# Patient Record
Sex: FEMALE | Race: OTHER | HISPANIC OR LATINO
[De-identification: names, ages, dates, MRNs, and addresses within clinical notes are randomized per-mention and may not be internally consistent; named-entity substitution may affect disease eponyms.]

---

## 2021-06-14 PROBLEM — Z00.00 ENCOUNTER FOR PREVENTIVE HEALTH EXAMINATION: Status: ACTIVE | Noted: 2021-06-14

## 2021-06-15 ENCOUNTER — APPOINTMENT (OUTPATIENT)
Dept: ANTEPARTUM | Facility: CLINIC | Age: 31
End: 2021-06-15
Payer: MEDICAID

## 2021-06-15 ENCOUNTER — ASOB RESULT (OUTPATIENT)
Age: 31
End: 2021-06-15

## 2021-06-15 PROCEDURE — 76813 OB US NUCHAL MEAS 1 GEST: CPT

## 2021-06-18 LAB
1ST TRIMESTER DATA: NORMAL
ADDENDUM DOC: NORMAL
AFP PNL SERPL: NORMAL
AFP SERPL-ACNC: NORMAL
CLINICAL BIOCHEMIST REVIEW: NORMAL
FREE BETA HCG 1ST TRIMESTER: NORMAL
Lab: NORMAL
NOTES NTD: NORMAL
NT: NORMAL
PAPP-A SERPL-ACNC: NORMAL
TRISOMY 18/3: NORMAL

## 2021-07-13 ENCOUNTER — APPOINTMENT (OUTPATIENT)
Dept: ANTEPARTUM | Facility: CLINIC | Age: 31
End: 2021-07-13
Payer: MEDICAID

## 2021-07-13 DIAGNOSIS — O35.1XX0 MATERNAL CARE FOR (SUSPECTED) CHROMOSOMAL ABNORMALITY IN FETUS, NOT APPLICABLE OR UNSPECIFIED: ICD-10-CM

## 2021-07-13 PROCEDURE — 36415 COLL VENOUS BLD VENIPUNCTURE: CPT

## 2021-07-16 LAB
1ST TRIMESTER DATA: NORMAL
2ND TRIMESTER DATA: NORMAL
AFP PNL SERPL: NORMAL
AFP SERPL-ACNC: NORMAL
AFP SERPL-ACNC: NORMAL
B-HCG FREE SERPL-MCNC: NORMAL
CLINICAL BIOCHEMIST REVIEW: NORMAL
FREE BETA HCG 1ST TRIMESTER: NORMAL
INHIBIN A SERPL-MCNC: NORMAL
NOTES NTD: NORMAL
NT: NORMAL
PAPP-A SERPL-ACNC: NORMAL
U ESTRIOL SERPL-SCNC: NORMAL

## 2021-07-27 ENCOUNTER — APPOINTMENT (OUTPATIENT)
Dept: ANTEPARTUM | Facility: CLINIC | Age: 31
End: 2021-07-27
Payer: MEDICAID

## 2021-07-27 ENCOUNTER — ASOB RESULT (OUTPATIENT)
Age: 31
End: 2021-07-27

## 2021-07-27 PROCEDURE — 76805 OB US >/= 14 WKS SNGL FETUS: CPT

## 2021-08-10 ENCOUNTER — APPOINTMENT (OUTPATIENT)
Dept: ANTEPARTUM | Facility: CLINIC | Age: 31
End: 2021-08-10
Payer: MEDICAID

## 2021-08-10 ENCOUNTER — ASOB RESULT (OUTPATIENT)
Age: 31
End: 2021-08-10

## 2021-08-10 PROCEDURE — 76816 OB US FOLLOW-UP PER FETUS: CPT

## 2021-10-05 ENCOUNTER — ASOB RESULT (OUTPATIENT)
Age: 31
End: 2021-10-05

## 2021-10-05 ENCOUNTER — APPOINTMENT (OUTPATIENT)
Dept: ANTEPARTUM | Facility: CLINIC | Age: 31
End: 2021-10-05
Payer: MEDICAID

## 2021-10-05 PROCEDURE — 76816 OB US FOLLOW-UP PER FETUS: CPT

## 2021-11-23 ENCOUNTER — ASOB RESULT (OUTPATIENT)
Age: 31
End: 2021-11-23

## 2021-11-23 ENCOUNTER — APPOINTMENT (OUTPATIENT)
Dept: ANTEPARTUM | Facility: CLINIC | Age: 31
End: 2021-11-23
Payer: MEDICAID

## 2021-11-23 PROCEDURE — 76819 FETAL BIOPHYS PROFIL W/O NST: CPT

## 2021-11-23 PROCEDURE — 76816 OB US FOLLOW-UP PER FETUS: CPT

## 2021-12-12 ENCOUNTER — OUTPATIENT (OUTPATIENT)
Dept: INPATIENT UNIT | Facility: HOSPITAL | Age: 31
LOS: 1 days | End: 2021-12-12
Payer: COMMERCIAL

## 2021-12-12 ENCOUNTER — EMERGENCY (EMERGENCY)
Facility: HOSPITAL | Age: 31
LOS: 1 days | Discharge: HOME | End: 2021-12-12
Attending: STUDENT IN AN ORGANIZED HEALTH CARE EDUCATION/TRAINING PROGRAM
Payer: COMMERCIAL

## 2021-12-12 VITALS — SYSTOLIC BLOOD PRESSURE: 132 MMHG | HEART RATE: 74 BPM | DIASTOLIC BLOOD PRESSURE: 86 MMHG

## 2021-12-12 VITALS
TEMPERATURE: 98 F | SYSTOLIC BLOOD PRESSURE: 138 MMHG | DIASTOLIC BLOOD PRESSURE: 86 MMHG | RESPIRATION RATE: 18 BRPM | HEART RATE: 74 BPM

## 2021-12-12 VITALS
WEIGHT: 156.09 LBS | DIASTOLIC BLOOD PRESSURE: 81 MMHG | HEART RATE: 80 BPM | SYSTOLIC BLOOD PRESSURE: 130 MMHG | OXYGEN SATURATION: 98 % | HEIGHT: 64 IN | RESPIRATION RATE: 18 BRPM | TEMPERATURE: 98 F

## 2021-12-12 DIAGNOSIS — O47.1 FALSE LABOR AT OR AFTER 37 COMPLETED WEEKS OF GESTATION: ICD-10-CM

## 2021-12-12 LAB
ALBUMIN SERPL ELPH-MCNC: 3.5 G/DL — SIGNIFICANT CHANGE UP (ref 3.3–5.2)
ALP SERPL-CCNC: 396 U/L — HIGH (ref 40–120)
ALT FLD-CCNC: 11 U/L — SIGNIFICANT CHANGE UP
ANION GAP SERPL CALC-SCNC: 14 MMOL/L — SIGNIFICANT CHANGE UP (ref 5–17)
APPEARANCE UR: CLEAR — SIGNIFICANT CHANGE UP
AST SERPL-CCNC: 21 U/L — SIGNIFICANT CHANGE UP
BASOPHILS # BLD AUTO: 0.03 K/UL — SIGNIFICANT CHANGE UP (ref 0–0.2)
BASOPHILS NFR BLD AUTO: 0.4 % — SIGNIFICANT CHANGE UP (ref 0–2)
BILIRUB SERPL-MCNC: 0.2 MG/DL — LOW (ref 0.4–2)
BILIRUB UR-MCNC: NEGATIVE — SIGNIFICANT CHANGE UP
BUN SERPL-MCNC: 14.2 MG/DL — SIGNIFICANT CHANGE UP (ref 8–20)
CALCIUM SERPL-MCNC: 8.8 MG/DL — SIGNIFICANT CHANGE UP (ref 8.6–10.2)
CHLORIDE SERPL-SCNC: 102 MMOL/L — SIGNIFICANT CHANGE UP (ref 98–107)
CO2 SERPL-SCNC: 20 MMOL/L — LOW (ref 22–29)
COLOR SPEC: YELLOW — SIGNIFICANT CHANGE UP
CREAT ?TM UR-MCNC: 81 MG/DL — SIGNIFICANT CHANGE UP
CREAT SERPL-MCNC: 0.8 MG/DL — SIGNIFICANT CHANGE UP (ref 0.5–1.3)
DIFF PNL FLD: NEGATIVE — SIGNIFICANT CHANGE UP
EOSINOPHIL # BLD AUTO: 0.12 K/UL — SIGNIFICANT CHANGE UP (ref 0–0.5)
EOSINOPHIL NFR BLD AUTO: 1.7 % — SIGNIFICANT CHANGE UP (ref 0–6)
EPI CELLS # UR: ABNORMAL
GLUCOSE SERPL-MCNC: 88 MG/DL — SIGNIFICANT CHANGE UP (ref 70–99)
GLUCOSE UR QL: NEGATIVE MG/DL — SIGNIFICANT CHANGE UP
HCT VFR BLD CALC: 31.9 % — LOW (ref 34.5–45)
HGB BLD-MCNC: 10.1 G/DL — LOW (ref 11.5–15.5)
IMM GRANULOCYTES NFR BLD AUTO: 0.3 % — SIGNIFICANT CHANGE UP (ref 0–1.5)
KETONES UR-MCNC: NEGATIVE — SIGNIFICANT CHANGE UP
LEUKOCYTE ESTERASE UR-ACNC: ABNORMAL
LIDOCAIN IGE QN: 46 U/L — SIGNIFICANT CHANGE UP (ref 22–51)
LYMPHOCYTES # BLD AUTO: 2.48 K/UL — SIGNIFICANT CHANGE UP (ref 1–3.3)
LYMPHOCYTES # BLD AUTO: 34.4 % — SIGNIFICANT CHANGE UP (ref 13–44)
MCHC RBC-ENTMCNC: 26.2 PG — LOW (ref 27–34)
MCHC RBC-ENTMCNC: 31.7 GM/DL — LOW (ref 32–36)
MCV RBC AUTO: 82.9 FL — SIGNIFICANT CHANGE UP (ref 80–100)
MONOCYTES # BLD AUTO: 0.42 K/UL — SIGNIFICANT CHANGE UP (ref 0–0.9)
MONOCYTES NFR BLD AUTO: 5.8 % — SIGNIFICANT CHANGE UP (ref 2–14)
NEUTROPHILS # BLD AUTO: 4.14 K/UL — SIGNIFICANT CHANGE UP (ref 1.8–7.4)
NEUTROPHILS NFR BLD AUTO: 57.4 % — SIGNIFICANT CHANGE UP (ref 43–77)
NITRITE UR-MCNC: NEGATIVE — SIGNIFICANT CHANGE UP
PH UR: 7 — SIGNIFICANT CHANGE UP (ref 5–8)
PLATELET # BLD AUTO: 274 K/UL — SIGNIFICANT CHANGE UP (ref 150–400)
POTASSIUM SERPL-MCNC: 4.1 MMOL/L — SIGNIFICANT CHANGE UP (ref 3.5–5.3)
POTASSIUM SERPL-SCNC: 4.1 MMOL/L — SIGNIFICANT CHANGE UP (ref 3.5–5.3)
PROT ?TM UR-MCNC: 38 MG/DL — HIGH (ref 0–12)
PROT SERPL-MCNC: 6.6 G/DL — SIGNIFICANT CHANGE UP (ref 6.6–8.7)
PROT UR-MCNC: NEGATIVE — SIGNIFICANT CHANGE UP
PROT/CREAT UR-RTO: 0.5 RATIO — HIGH
RBC # BLD: 3.85 M/UL — SIGNIFICANT CHANGE UP (ref 3.8–5.2)
RBC # FLD: 17.4 % — HIGH (ref 10.3–14.5)
RBC CASTS # UR COMP ASSIST: NEGATIVE /HPF — SIGNIFICANT CHANGE UP (ref 0–4)
SODIUM SERPL-SCNC: 136 MMOL/L — SIGNIFICANT CHANGE UP (ref 135–145)
SP GR SPEC: 1 — LOW (ref 1.01–1.02)
UROBILINOGEN FLD QL: NEGATIVE MG/DL — SIGNIFICANT CHANGE UP
WBC # BLD: 7.21 K/UL — SIGNIFICANT CHANGE UP (ref 3.8–10.5)
WBC # FLD AUTO: 7.21 K/UL — SIGNIFICANT CHANGE UP (ref 3.8–10.5)
WBC UR QL: SIGNIFICANT CHANGE UP

## 2021-12-12 PROCEDURE — 81001 URINALYSIS AUTO W/SCOPE: CPT

## 2021-12-12 PROCEDURE — 99284 EMERGENCY DEPT VISIT MOD MDM: CPT | Mod: 25

## 2021-12-12 PROCEDURE — 83690 ASSAY OF LIPASE: CPT

## 2021-12-12 PROCEDURE — 76705 ECHO EXAM OF ABDOMEN: CPT | Mod: 26,RT

## 2021-12-12 PROCEDURE — T1013: CPT

## 2021-12-12 PROCEDURE — 76705 ECHO EXAM OF ABDOMEN: CPT

## 2021-12-12 PROCEDURE — 84156 ASSAY OF PROTEIN URINE: CPT

## 2021-12-12 PROCEDURE — 80053 COMPREHEN METABOLIC PANEL: CPT

## 2021-12-12 PROCEDURE — 99285 EMERGENCY DEPT VISIT HI MDM: CPT

## 2021-12-12 PROCEDURE — 59025 FETAL NON-STRESS TEST: CPT

## 2021-12-12 PROCEDURE — 82570 ASSAY OF URINE CREATININE: CPT

## 2021-12-12 PROCEDURE — G0463: CPT

## 2021-12-12 PROCEDURE — 0225U NFCT DS DNA&RNA 21 SARSCOV2: CPT

## 2021-12-12 PROCEDURE — 87086 URINE CULTURE/COLONY COUNT: CPT

## 2021-12-12 PROCEDURE — 85025 COMPLETE CBC W/AUTO DIFF WBC: CPT

## 2021-12-12 PROCEDURE — 36415 COLL VENOUS BLD VENIPUNCTURE: CPT

## 2021-12-12 RX ORDER — ACETAMINOPHEN 500 MG
650 TABLET ORAL ONCE
Refills: 0 | Status: COMPLETED | OUTPATIENT
Start: 2021-12-12 | End: 2021-12-12

## 2021-12-12 RX ADMIN — Medication 650 MILLIGRAM(S): at 21:06

## 2021-12-12 NOTE — ED ADULT TRIAGE NOTE - CHIEF COMPLAINT QUOTE
39 week pregnant patient cleared by L&D  states that she is here for cough, when coughing has abdominal pain

## 2021-12-12 NOTE — OB PROVIDER TRIAGE NOTE - HISTORY OF PRESENT ILLNESS
32yo  at 39w1d presented to [facility] L&D triage with cough and vague abdominal pain with coughing. She reports that the pain is also there with fetal movement. Patient states that one of her kids who is school are, also has a cough. Patient repots that neither her nor her children are vaccinated against COVID 19. She has not been tested, neither did she test her child.  Denies obstetrical complaints, reports good fetal movement. Denies subjective fever, chills malaise, reports feeling of exhaustion and weakness    Vital Signs Last 24 Hrs  T(C): 36.6 (12 Dec 2021 20:02), Max: 36.9 (12 Dec 2021 19:14)  T(F): 97.9 (12 Dec 2021 20:02), Max: 98.4 (12 Dec 2021 19:14)  HR: 80 (12 Dec 2021 20:02) (74 - 80)  BP: 130/81 (12 Dec 2021 20:02) (130/81 - 138/86)  RR: 18 (12 Dec 2021 20:02) (18 - 18)  SpO2: 98% (12 Dec 2021 20:02) (98% - 98%)    Physical exam:  Gen: NAD  Neuro: A&Ox4 memory intact  Heart: rrr  Lungs: CTA bl  Abdomen: gravid, good fetal movement appreciated  VE: long closed posterior cervix  LE: nontender  FHT: 140, mderate variability, + accelerations, no decelerations  TOCO: irregular painless contractions    Plan:   abdominal pain likely MSK from recurrent cough  closed on exam, not in labor  Directed to the ED for evaluation of the Cough

## 2021-12-12 NOTE — ED STATDOCS - PATIENT/CAREGIVER ACCEPTED INTERPRETER SERVICES
Groin Strain (Adult)     A groin strain is a stretching or partial tearing of the muscle in the lower abdomen or upper thigh. This may happen because of too much coughing, heavy lifting, or active sports. The pain may last for several days to weeks, depending on how bad the stretch or tear is. It will generally get better with rest, ice, and anti-inflammatory medicines.  A groin strain can lead to a groin hernia. This is also called an inguinal hernia. A hernia is a complete tear of the abdominal muscle. This allows fat or the intestines to bulge out and create a visible bump just above the thigh crease. This is a more serious problem and may need surgery to repair it. When you lie down, the bump should get smaller or disappear completely. If it doesnt, and you are not able to flatten it with your hand, you need medical attention right away.  Home care  · Avoid heavy lifting, straining, or any activities that cause groin pain.  · You may use over-the-counter pain medicine to control pain, unless another pain medicine was prescribed. If you have chronic liver or kidney disease or ever had a stomach ulcer or GI bleeding, talk with your healthcare provider before using these medicines.  Follow-up care  Follow up with your healthcare provider, or as advised. Make an appointment with your healthcare provider if you develop a bump in the area of the groin strain.  When to seek medical advice  Call your healthcare provider right away if any of these occur:  · Increasing pain in the area of the groin strain  · Tender bump just above the groin crease that does not flatten when you lie down or press on it  · Overall abdominal swelling or pain  · Fever of 100.4°F (38°C) or above lasting for 24 to 48 hours  · Repeated vomiting  · Pain that moves to the lower right abdomen, just below the waistline, or spreads to the back  Date Last Reviewed: 11/19/2015  © 4878-9224 [employer]. [address] [place] [address], [place] [address]. All rights reserved. This information is not intended as a substitute for professional medical care. Always follow your healthcare professional's instructions.    Possible - right hip Labrum tear.  Follow up with your doctor in a few days.  Go to the ER if symptoms get worse.    Jovi Stuart MD   yes

## 2021-12-12 NOTE — ED STATDOCS - PATIENT PORTAL LINK FT
You can access the FollowMyHealth Patient Portal offered by [facility] by registering at the following website: [email or web address]. By joining [facility]’s FollowMyHealth portal, you will also be able to view your health information using other applications (apps) compatible with our system.

## 2021-12-12 NOTE — ED STATDOCS - PHYSICAL EXAMINATION
General: well-appearing, no acute distress  Head: normocephalic, atraumatic  Eyes: PERRL   Mouth: moist mucous membranes  Neck: supple neck, no lymphadenopathy  CV: normal rate and rhythm, no LE edema, peripheral pulses 2+ bilateral UE and LE  Respiratory: clear to auscultation bilaterally  Abdomen: soft, Gravid abdomen, TTP in RUQ, nondistended  : no suprapubic tenderness, no CVAT  MSK: no joint deformities  Neuro: alert and oriented x3, speech clear, moving all extremities spontaneously without difficulty  Skin: no rash noted

## 2021-12-12 NOTE — ED STATDOCS - OBJECTIVE STATEMENT
30 y/o female with no PMHx presents to ED c/o cough. Patient reports cough and abdominal pain that began yesterday.     Denies fever, N/V/D, recent sick contacts, leg pain or swelling, dysuria  : Isha ID# [id number] 30 y/o  female with no PMHx presents to ED c/o cough and RUQ abdominal pain that started yesterday. She was evaluated in L&D with NST and had no abnormalities and sent to ED for further evaluation of abdominal pain and cough. Denies fever, N/V/D, recent sick contacts, leg pain or swelling, dysuria. No vaginal bleeding or dysuria.  : Isha ID# [id number]

## 2021-12-12 NOTE — ED STATDOCS - PROGRESS NOTE DETAILS
NATHALY Kendall: Patient evaluated by intake physician. HPI/ROS/PE as noted above. Will follow up plan per intake physician and continue to assess patient.

## 2021-12-12 NOTE — OB RN TRIAGE NOTE - FALL HARM RISK - UNIVERSAL INTERVENTIONS
Bed in lowest position, wheels locked, appropriate side rails in place/Call bell, personal items and telephone in reach/Instruct patient to call for assistance before getting out of bed or chair/Non-slip footwear when patient is out of bed/[place] to call system/Physically safe environment - no spills, clutter or unnecessary equipment/Purposeful Proactive Rounding/Room/bathroom lighting operational, light cord in reach

## 2021-12-12 NOTE — ED STATDOCS - NS ED ROS FT
General: no fevers  Head: no headaches  Eyes: no vision change  ENT: no nasal discharge/congestion, no sore throat, no ear pain  CV: no chest pain  Resp: no SOB, (+) cough  GI: no N/V/D, (+) abdominal pain, no blood in stool  : no dysuria, no hematuria   MSK: no joint pain, no muscle aches, no neck pain or back pain  Skin: no new rash  Neuro: no focal weakness, no change in sensation

## 2021-12-12 NOTE — ED STATDOCS - ATTENDING CONTRIBUTION TO CARE
I have personally performed a face to face medical and diagnostic evaluation of the patient. I have discussed the case with the ACP and reviewed their addition to the note. Please see the HPI, ROS, PE and MDM as written by the scribe in my presence.

## 2021-12-12 NOTE — ED STATDOCS - NSFOLLOWUPINSTRUCTIONS_ED_ALL_ED_FT
- Acetaminophen 650mg every 6 hours for pain.   - Please keep scheduled OBGYN appointment tomorrow.   - Please bring all documentation from your ED visit to any related future follow up appointment.  - Please call to schedule follow up appointment with your primary care physician within 24-48 hours.  - Please seek immediate medical attention or return to the ED for any new/worsening, signs/symptoms, or concerns.    Feel better!     - Acetaminofén 650 mg cada 6 horas para el dolor.  - Por favor, asista a la farida programada para el obstetra y ginecólogo mañana.  - Traiga toda la documentación de gonsalez visita al servicio de urgencias a cualquier farida de seguimiento futura relacionada.  - Llame para programar helga farida de seguimiento con gonsalez médico de atención primaria dentro de las 24 a 48 horas.  - Busque atención médica inmediata o regrese al servicio de urgencias por cualquier signo / síntoma nuevo o que empeore, o si tiene alguna inquietud.    ¡Sentirse mejor!    Dolor abdominal maryann el embarazo    Abdominal Pain During Pregnancy       El dolor abdominal es común maryann el embarazo y tiene muchas causas posibles. Algunas causas son más graves que otras, y a veces la causa se desconoce. El dolor abdominal puede ser un indicio de que está comenzando el parto. También puede ser ocasionado por el crecimiento y estiramiento de los músculos y ligamentos maryann el embarazo. Siempre informe a gonsalez médico si siente dolor abdominal.      Siga estas indicaciones en gonsalez casa:    • No tenga relaciones sexuales ni se coloque nada dentro de la vagina hasta que el dolor haya desaparecido completamente.      •Descanse todo lo que pueda hasta que el dolor se le haya calmado.      •Ivette suficiente líquido para mantener la orina de color amarillo pálido.      •[place] los medicamentos de venta reuben y los recetados solamente gunner se lo haya indicado el médico.      •Concurra a todas las visitas de control gunner se lo haya indicado el médico. [employer] es importante.        Comuníquese con un médico si:    •El dolor continúa o empeora después de descansar.    •Siente dolor en la parte inferior del abdomen que:  •Va y viene en intervalos regulares.      •Se extiende a la espalda.      •Es parecido a los cólicos menstruales.        •Siente dolor o ardor al orinar.        Solicite ayuda de inmediato si:    •Tiene fiebre o siente escalofríos.      •Tiene helga hemorragia vaginal abundante.      •Tiene helga pérdida de líquido por la vagina.      •Elimina tejidos por la vagina.      •Vomita o tiene diarrea maryann más de 24 horas.      •El bebé se mueve menos de lo habitual.      •Se siente débil o se desmaya.      •Le falta el aire.      •Siente dolor intenso en la parte superior del abdomen.        Resumen    •El dolor abdominal es común maryann el embarazo y tiene muchas causas posibles.      •Si siente dolor abdominal maryann el embarazo, informe al médico de inmediato.      •Siga las indicaciones del médico para el cuidado en el hogar y concurra a todas las visitas de control gunner se lo hayan indicado.      Esta información no tiene gunner fin reemplazar el consejo del médico. Asegúrese de hacerle al médico cualquier pregunta que tenga.

## 2021-12-12 NOTE — ED ADULT NURSE NOTE - OBJECTIVE STATEMENT
, EDC , [facility] patient presents for cough x 1 week, when she coughs, she gets abd pain, no fever, no n/v/d reported, darin

## 2021-12-12 NOTE — ED STATDOCS - CLINICAL SUMMARY MEDICAL DECISION MAKING FREE TEXT BOX
32 y/o female at 39 weeks presents with right sided abdominal pain in setting of cough x1-2 days, patent cleared by L&D after NST. On exam, patient is in no distress and has some RUQ TTP. Will consider msk vs pancreatitis vs cholecystitis. Check blood work, UA, and RUQ ultrasound. Will also obtain RVP swab to evaluate for cough.

## 2021-12-13 LAB
RAPID RVP RESULT: SIGNIFICANT CHANGE UP
SARS-COV-2 RNA SPEC QL NAA+PROBE: SIGNIFICANT CHANGE UP

## 2021-12-14 ENCOUNTER — APPOINTMENT (OUTPATIENT)
Dept: ANTEPARTUM | Facility: CLINIC | Age: 31
End: 2021-12-14
Payer: MEDICAID

## 2021-12-14 ENCOUNTER — ASOB RESULT (OUTPATIENT)
Age: 31
End: 2021-12-14

## 2021-12-14 LAB
CULTURE RESULTS: SIGNIFICANT CHANGE UP
SPECIMEN SOURCE: SIGNIFICANT CHANGE UP

## 2021-12-14 PROCEDURE — 76819 FETAL BIOPHYS PROFIL W/O NST: CPT

## 2021-12-14 PROCEDURE — 76816 OB US FOLLOW-UP PER FETUS: CPT

## 2021-12-15 ENCOUNTER — INPATIENT (INPATIENT)
Facility: HOSPITAL | Age: 31
LOS: 1 days | Discharge: HOME | End: 2021-12-17
Attending: OBSTETRICS & GYNECOLOGY | Admitting: OBSTETRICS & GYNECOLOGY
Payer: COMMERCIAL

## 2021-12-15 ENCOUNTER — TRANSCRIPTION ENCOUNTER (OUTPATIENT)
Age: 31
End: 2021-12-15

## 2021-12-15 VITALS
DIASTOLIC BLOOD PRESSURE: 87 MMHG | HEART RATE: 84 BPM | RESPIRATION RATE: 16 BRPM | TEMPERATURE: 98 F | SYSTOLIC BLOOD PRESSURE: 133 MMHG

## 2021-12-15 DIAGNOSIS — O26.893 OTHER SPECIFIED PREGNANCY RELATED CONDITIONS, THIRD TRIMESTER: ICD-10-CM

## 2021-12-15 DIAGNOSIS — O47.1 FALSE LABOR AT OR AFTER 37 COMPLETED WEEKS OF GESTATION: ICD-10-CM

## 2021-12-15 RX ORDER — KETOROLAC TROMETHAMINE 30 MG/ML
30 SYRINGE (ML) INJECTION ONCE
Refills: 0 | Status: DISCONTINUED | OUTPATIENT
Start: 2021-12-15 | End: 2021-12-15

## 2021-12-15 RX ORDER — CITRIC ACID/SODIUM CITRATE 300-500 MG
30 SOLUTION, ORAL ORAL ONCE
Refills: 0 | Status: DISCONTINUED | OUTPATIENT
Start: 2021-12-15 | End: 2021-12-16

## 2021-12-15 RX ORDER — ACETAMINOPHEN 500 MG
975 TABLET ORAL
Refills: 0 | Status: DISCONTINUED | OUTPATIENT
Start: 2021-12-15 | End: 2021-12-15

## 2021-12-15 RX ORDER — TETANUS TOXOID, REDUCED DIPHTHERIA TOXOID AND ACELLULAR PERTUSSIS VACCINE, ADSORBED 5; 2.5; 8; 8; 2.5 [IU]/.5ML; [IU]/.5ML; UG/.5ML; UG/.5ML; UG/.5ML
0.5 SUSPENSION INTRAMUSCULAR ONCE
Refills: 0 | Status: DISCONTINUED | OUTPATIENT
Start: 2021-12-15 | End: 2021-12-15

## 2021-12-15 RX ORDER — CITRIC ACID/SODIUM CITRATE 300-500 MG
30 SOLUTION, ORAL ORAL ONCE
Refills: 0 | Status: DISCONTINUED | OUTPATIENT
Start: 2021-12-15 | End: 2021-12-15

## 2021-12-15 RX ORDER — HYDROCORTISONE 1 %
1 OINTMENT (GRAM) TOPICAL EVERY 6 HOURS
Refills: 0 | Status: DISCONTINUED | OUTPATIENT
Start: 2021-12-15 | End: 2021-12-15

## 2021-12-15 RX ORDER — IBUPROFEN 200 MG
600 TABLET ORAL EVERY 6 HOURS
Refills: 0 | Status: DISCONTINUED | OUTPATIENT
Start: 2021-12-15 | End: 2021-12-15

## 2021-12-15 RX ORDER — OXYTOCIN 10 UNIT/ML
333.33 VIAL (ML) INJECTION
Qty: 20 | Refills: 0 | Status: DISCONTINUED | OUTPATIENT
Start: 2021-12-15 | End: 2021-12-15

## 2021-12-15 RX ORDER — OXYCODONE HYDROCHLORIDE 5 MG/1
5 TABLET ORAL
Refills: 0 | Status: DISCONTINUED | OUTPATIENT
Start: 2021-12-15 | End: 2021-12-15

## 2021-12-15 RX ORDER — SODIUM CHLORIDE 9 MG/ML
3 INJECTION INTRAMUSCULAR; INTRAVENOUS; SUBCUTANEOUS EVERY 8 HOURS
Refills: 0 | Status: DISCONTINUED | OUTPATIENT
Start: 2021-12-15 | End: 2021-12-15

## 2021-12-15 RX ORDER — AER TRAVELER 0.5 G/1
1 SOLUTION RECTAL; TOPICAL EVERY 4 HOURS
Refills: 0 | Status: DISCONTINUED | OUTPATIENT
Start: 2021-12-15 | End: 2021-12-15

## 2021-12-15 RX ORDER — DIPHENHYDRAMINE HCL 50 MG
25 CAPSULE ORAL EVERY 6 HOURS
Refills: 0 | Status: DISCONTINUED | OUTPATIENT
Start: 2021-12-15 | End: 2021-12-15

## 2021-12-15 RX ORDER — SODIUM CHLORIDE 9 MG/ML
1000 INJECTION, SOLUTION INTRAVENOUS
Refills: 0 | Status: DISCONTINUED | OUTPATIENT
Start: 2021-12-15 | End: 2021-12-15

## 2021-12-15 RX ORDER — LANOLIN
1 OINTMENT (GRAM) TOPICAL EVERY 6 HOURS
Refills: 0 | Status: DISCONTINUED | OUTPATIENT
Start: 2021-12-15 | End: 2021-12-15

## 2021-12-15 RX ORDER — SODIUM CHLORIDE 9 MG/ML
1000 INJECTION, SOLUTION INTRAVENOUS
Refills: 0 | Status: DISCONTINUED | OUTPATIENT
Start: 2021-12-15 | End: 2021-12-16

## 2021-12-15 RX ORDER — PRAMOXINE HYDROCHLORIDE 150 MG/15G
1 AEROSOL, FOAM RECTAL EVERY 4 HOURS
Refills: 0 | Status: DISCONTINUED | OUTPATIENT
Start: 2021-12-15 | End: 2021-12-15

## 2021-12-15 RX ORDER — BENZOCAINE 10 %
1 GEL (GRAM) MUCOUS MEMBRANE EVERY 6 HOURS
Refills: 0 | Status: DISCONTINUED | OUTPATIENT
Start: 2021-12-15 | End: 2021-12-15

## 2021-12-15 RX ORDER — OXYCODONE HYDROCHLORIDE 5 MG/1
5 TABLET ORAL ONCE
Refills: 0 | Status: DISCONTINUED | OUTPATIENT
Start: 2021-12-15 | End: 2021-12-15

## 2021-12-15 RX ORDER — OXYTOCIN 10 UNIT/ML
333.33 VIAL (ML) INJECTION
Qty: 20 | Refills: 0 | Status: DISCONTINUED | OUTPATIENT
Start: 2021-12-15 | End: 2021-12-16

## 2021-12-15 RX ORDER — DIBUCAINE 1 %
1 OINTMENT (GRAM) RECTAL EVERY 6 HOURS
Refills: 0 | Status: DISCONTINUED | OUTPATIENT
Start: 2021-12-15 | End: 2021-12-15

## 2021-12-15 RX ORDER — SIMETHICONE 80 MG/1
80 TABLET, CHEWABLE ORAL EVERY 4 HOURS
Refills: 0 | Status: DISCONTINUED | OUTPATIENT
Start: 2021-12-15 | End: 2021-12-15

## 2021-12-15 RX ORDER — MAGNESIUM HYDROXIDE 400 MG/1
30 TABLET, CHEWABLE ORAL
Refills: 0 | Status: DISCONTINUED | OUTPATIENT
Start: 2021-12-15 | End: 2021-12-15

## 2021-12-15 RX ADMIN — SODIUM CHLORIDE 125 MILLILITER(S): 9 INJECTION, SOLUTION INTRAVENOUS at 23:59

## 2021-12-15 NOTE — OB PROVIDER H&P - NSHPPHYSICALEXAM_GEN_ALL_CORE
T(C): 36.6 (12-15-21 @ 22:56), Max: 36.6 (12-15-21 @ 22:39)  HR: 81 (12-15-21 @ 22:56) (81 - 84)  BP: 125/86 (12-15-21 @ 22:56) (125/86 - 133/87)  RR: 18 (12-15-21 @ 22:56) (16 - 18)    Gen: NAD, well-appearing, AAOx3   Abd: Soft, gravid  Ext: non-tender, non-edematous  SVE:  8/100/-1  Bedside sono: Vertex  FHT:110bpm, moderae variability, +acels, no decels  Strum:q6min

## 2021-12-15 NOTE — OB RN PATIENT PROFILE - FALL HARM RISK - UNIVERSAL INTERVENTIONS
Bed in lowest position, wheels locked, appropriate side rails in place/Call bell, personal items and telephone in reach/Instruct patient to call for assistance before getting out of bed or chair/Non-slip footwear when patient is out of bed/[place] to call system/Physically safe environment - no spills, clutter or unnecessary equipment/Purposeful Proactive Rounding/Room/bathroom lighting operational, light cord in reach

## 2021-12-15 NOTE — OB RN PATIENT PROFILE - NS_NPO_OBGYN_ALL_OB_DT
"Subjective   Patient ID: Greta Quinn is a 43year old female. Chief Complaint   Patient presents with   [facility]   New patient evaluation. Previously seen by family practice, Dr. Chencho Goodwin. Previously was seeing physician in [place] prior to moving to the area. History of anxiety, depression, headaches,   C/o nausea, dull ha, eat less, occ gerd , no abdominal pain. No  Vomit. Diet-sprite , gold fish crackers . wgt loss 5 lbs , minimal change per pt. No nsaids,  Use tylenol,  Or fioracet daily . Medication reviewed. Only new med is Black cohash otc for hot flashes. History of irregular menstrual cycles, last 10/2018 period. Last Pap smear March 2017. History of infertility issues related to endometriosis. Not sexually active. Not pregnant. Psychiatry seen"" in step\"" program , seen physician on 8/16/19 . Tried cymbalta - stopped d/t side effect hyper . On  wellbutrin only now . HPI  Review of Systems   Constitutional: Positive for fatigue and unexpected weight change. Negative for chills and fever. HENT: Negative. Respiratory: Negative for cough, chest tightness, shortness of breath and wheezing. Cardiovascular: Negative for chest pain, palpitations and leg swelling. Gastrointestinal: Positive for nausea. Negative for abdominal pain, blood in stool, constipation, diarrhea and vomiting. Genitourinary: Positive for menstrual problem and urgency. Negative for difficulty urinating, dysuria, frequency, pelvic pain, vaginal bleeding and vaginal pain. Musculoskeletal: Negative for arthralgias, back pain and gait problem. Skin: Negative. Neurological: Positive for headaches. Negative for dizziness, syncope, weakness and numbness. Hematological: Negative for adenopathy. Does not bruise/bleed easily. Psychiatric/Behavioral: Positive for dysphoric mood. Negative for self-injury and suicidal ideas. The patient is nervous/anxious.         ALLERGIES:  No Known Allergies  Current " "Outpatient Medications   Medication Sig   â¢ melatonin 5 MG Take 1 tablet by mouth nightly. â¢ butalbital-APAP-caffeine (FIORICET) -40 MG per tablet TAKE 1 TABLET BY MOUTH EVERY 4 HOURS AS NEEDED FOR PAIN   â¢ buPROPion (WELLBUTRIN XL) 300 MG 24 hr tablet Take 1 tablet by mouth daily. â¢ naproxen (NAPROSYN) 500 MG tablet Take 1 tablet by mouth every 12 hours as needed for Pain. â¢ vitamin - therapeutic multivitamins w/minerals (CENTRUM SILVER,THERA-M) Tab Take 1 tablet by mouth daily. â¢ diphenhydrAMINE (BENADRYL) 25 MG capsule Take 50 mg by mouth nightly as needed for Sleep. No current facility-administered medications for this visit. Past Medical History:   Diagnosis Date   â¢ Anxiety    â¢ Depression    â¢ Dislocated hip (CMS/HCC)     @ birth, unsure if surgical repair or closed repair   â¢ Endometriosis determined by laparoscopy    â¢ Fracture 2016    small finger right hand   â¢ Migraine    â¢ RAD (reactive airway disease)      Past Surgical History:   Procedure Laterality Date   â¢ Infertility      Lap surgery for infertility   â¢ Knee arthroscopy w/ meniscal repair Right 2013     Family History   Problem Relation Age of Onset   â¢ Heart disease Mother    â¢ Psychiatric Maternal Aunt         suicide attempt, unsure diagnosis     Social History     Substance and Sexual Activity   Alcohol Use Yes   â¢ Alcohol/week: 0.0 standard drinks    Comment: Very rarely     Social History     Tobacco Use   Smoking Status Former Smoker   â¢ Packs/day: 0.25   â¢ Years: 17.00   â¢ Pack years: 4.25   â¢ Types: Cigarettes   â¢ Start date: 1998   â¢ Last attempt to quit: 2017   â¢ Years since quittin.0   Smokeless Tobacco Never Used       Objective   Visit Vitals  /78   Pulse 84   Temp 97.5 Â°F (36.4 Â°C) (Temporal)   Ht 5' 9"" (1.753 m)   Wt 135.2 kg (298 lb)   BMI 44.01 kg/mÂ²     Physical Exam  Constitutional:       General: She is not in acute distress. Appearance: She is well-developed.  She is " obese. She is not ill-appearing or diaphoretic. HENT:      Head: Normocephalic and atraumatic. Right Ear: External ear normal.      Left Ear: External ear normal.      Mouth/Throat:      Mouth: Mucous membranes are moist.      Pharynx: Oropharynx is clear. Eyes:      General: No scleral icterus. Extraocular Movements: Extraocular movements intact. Conjunctiva/sclera: Conjunctivae normal.      Pupils: Pupils are equal, round, and reactive to light. Neck:      Musculoskeletal: Neck supple. No muscular tenderness. Thyroid: No thyromegaly. Vascular: No carotid bruit or JVD. Cardiovascular:      Rate and Rhythm: Normal rate and regular rhythm. Heart sounds: Normal heart sounds. No murmur. Pulmonary:      Effort: Pulmonary effort is normal. No respiratory distress. Breath sounds: Normal breath sounds. No wheezing. Abdominal:      General: Bowel sounds are normal. There is no distension. Palpations: Abdomen is soft. There is no mass. Tenderness: There is no guarding or rebound. Comments: Minimal tenderness right upper quadrant to deep palpation   Musculoskeletal: Normal range of motion. Right lower leg: No edema. Left lower leg: No edema. Lymphadenopathy:      Cervical: No cervical adenopathy. Skin:     General: Skin is warm and dry. Findings: No bruising, erythema or rash. Neurological:      General: No focal deficit present. Mental Status: She is alert and oriented to person, place, and time. Cranial Nerves: No cranial nerve deficit. Sensory: No sensory deficit. Psychiatric:         Behavior: Behavior normal.         Thought Content: Thought content normal.         Judgement: Judgment normal.           No visits with results within 1 Month(s) from this visit.    Latest known visit with results is:   Office Visit on 03/10/2017   Component Date Value Ref Range Status   â¢ PATH REPORT, PAPHPV 03/10/2017    Final Value:Name: Shoshana Buck                 MRN:     [id number]    :  1982                     Visit#:  [id number]                            Cytology Report        Client: Jameson ALMANZAR/VINCE Wick         Date Specimen Collected: 03/10/17            Accession #:  [id number]    Date Specimen Received:  17            Requisition #:[id number]    Date Reported:           3/15/2017 14:51    Location:     [address] [place] [address]                            * Addendum Present *    ______________________________________________________________________________    Cytologic Interpretation :        NEGATIVE FOR INTRAEPITHELIAL LESION OR MALIGNANCY. Shift in marnie suggestive of bacterial vaginosis. Satisfactory for evaluation. Presence of endocervical/transformation zone    component. Lack of pertinent clinical information, no menstrual period date (LMP)    provided. Madeleine Garcia. Andrea CT(ASCP)    Sowmya Cordero                           CT(ASCP)        ** Electronic Signature (MAT) 3/15/2017 14:51 **        Educational note: The Pap test is a screening test with a well-recognized    false negative rate. The best means available to lower the false negative    rate and to detect early cervical lesions is a Pap test at regular intervals. All ThinPrep Paps will be reviewed with the aid of the ThinPrep Imaging    System, unless otherwise specified.         ______________________________________________________________________________    Clinical Information:    REASON FOR COLLECTION::LOW RISK - ENCOUNTER FOR SCREENING FOR MALIGNANT    NEOPLASM OF CERVIX Z12.4    SOURCE::CERVICAL        Specimen(s) Submitted:     PAP with High Risk HPV        Procedures/Addenda:    HPV, High Risk_WI:    Date Ordered:     3/13/2017     Date Reported:3/13/2017        Interpretation                                          Aptima High Risk HPV Assay Result:  NEGATIVE            Results-Comments    The APTIMA HPV Assay is an in vitro nucle                          ic acid amplification test for the    qualitative detection of E6/E7 viral messenger RNA (mRNA) from 14 high-risk    types of human papillomavirus (HPV) in cervical specimens. The high-risk HPV    types detected by the assay include: 16, 18, 31, 33, 35, 39, 45, 51, 52, 56,    58, 59, 66, and 68. The Aptima HPV Assay does not discriminate between the 14    high-risk types. Cervical specimens in the Thin Prep Pap Test vials containing    PreservCyt Solution and collected with broom-type or cytobrush/spatula    collection devices are FDA approved for this assay using the [facility]. The  Aptima High Risk HPV assay is not intended for use as a screening device    for women under 30 with normal cervical cytology or  replacement of  regular    cervical cytology screening. The Aptima High Risk HPV assay is not FDA approved for testing on vaginal    and/or rectal specimens. [employer] has internally validated these    specimen sources. The expected normal reference ra                          nge is negative. Tech Code:  51881                                      [facility] Cytology Department                            ** Electronic Signature ** (ALB) 3/13/2017 16:02 **                ICD Codes:     Z12.4 E66.01 Z68.41        Fee Codes:     A: [id number]     HPV_WI: [id number]        Performing Lab Location (Unless otherwise specified):    [address]             Assessment and Plan  1. Nausea  Urology unclear possibly gastritis, reflux, hepatobiliary disease. Recommend bland diet. Avoid nonsteroidals. May use over-the-counter Zantac p.o. twice daily. Check labs. - CBC WITH DIFFERENTIAL; Future  - COMPREHENSIVE MET PNL (FAST); Future    2. Depressed affect  Continue with Wellbutrin. Following up with psychiatry.     3. Class 3 severe obesity without serious comorbidity with body mass index (BMI) of 45.0 to 49.9 in adult, unspecified obesity type (CMS/HCC)  Recommend better diet. Calorie restricted to 1500 chas or less. Low-carb, exercise program.  Rule out thyroid abnormality-check TSH  - COMPREHENSIVE MET PNL (FAST); Future  - THYROID STIMULATING HORMONE; Future    4. Irregular periods  Chronic, check hormone levels for early menopause. Patient states that she is not sexually active. Risk of pregnancy low. Recommend follow-up with OB/GYN  for pelvic and Pap  - FOLLICLE STIMULATING HORMONE; Future  - CBC WITH DIFFERENTIAL; Future  - LUTENIZING HORMONE; Future  - THYROID STIMULATING HORMONE; Future    Medication changes: No     Follow-up in 4  months    Greater than 50% of the 35 minute appointment was spent counseling patient regarding disease management, and treatment plan.     Tania García MD  08/24/19  9:57 AM 15-Dec-2021 14:00

## 2021-12-15 NOTE — OB PROVIDER H&P - ASSESSMENT
A/P: 31y  at 39.4 weeks GA admitted for labor    -Admit to L&D  -Consent  -Admission labs  - NPO  -IV fluids  -GBS ppx, no ppx needed neg    d/w Dr. Anthony

## 2021-12-15 NOTE — OB PROVIDER H&P - HISTORY OF PRESENT ILLNESS
31y  at 39.4 weeks GA coming in with contractions increasing in frequency and intesity. Patient denies vaginal bleeding, and leakage of fluid. She endorses good fetal movement. Denies fevers, chills, nausea, vomiting, chest pain, SOB, dizziness and headache. No other complaints at this time.   JOSELIN: 19    Prenatal course uncomplicated.      POB: NSVDx3  PGYN: -fibroids, -ovarian cysts, denies STD hx, denies abnormal PAPs   PMH: Denies  PSH: Denies  SH: Denies EtOH, tobacco and illicit drug use during this pregnancy; feels safe at home   Meds: PNVs  Allergies: NKDA          A/P:   -Admit to L&D  -Consent  -Admission labs  -NPO, except ice chips   -IV fluids  -Labor: Intact/*ROM. Latent/Active labor. Jaden *.   -Fetus: Cat I tracing. Continuous toco and fetal monitoring.   -GBS: Negative, no GBS ppx required   -Analgesia:     Discussed with  _

## 2021-12-15 NOTE — OB RN TRIAGE NOTE - FALL HARM RISK - UNIVERSAL INTERVENTIONS
Bed in lowest position, wheels locked, appropriate side rails in place/Call bell, personal items and telephone in reach/Instruct patient to call for assistance before getting out of bed or chair/Non-slip footwear when patient is out of bed/[place] to call system/Physically safe environment - no spills, clutter or unnecessary equipment/Purposeful Proactive Rounding/Room/bathroom lighting operational, light cord in reach

## 2021-12-16 LAB
BASOPHILS # BLD AUTO: 0.04 K/UL — SIGNIFICANT CHANGE UP (ref 0–0.2)
BASOPHILS NFR BLD AUTO: 0.5 % — SIGNIFICANT CHANGE UP (ref 0–2)
BLD GP AB SCN SERPL QL: SIGNIFICANT CHANGE UP
COVID-19 SPIKE DOMAIN AB INTERP: POSITIVE
COVID-19 SPIKE DOMAIN ANTIBODY RESULT: 85.1 U/ML — HIGH
EOSINOPHIL # BLD AUTO: 0.08 K/UL — SIGNIFICANT CHANGE UP (ref 0–0.5)
EOSINOPHIL NFR BLD AUTO: 0.9 % — SIGNIFICANT CHANGE UP (ref 0–6)
HCT VFR BLD CALC: 35.1 % — SIGNIFICANT CHANGE UP (ref 34.5–45)
HGB BLD-MCNC: 11 G/DL — LOW (ref 11.5–15.5)
IMM GRANULOCYTES NFR BLD AUTO: 0.2 % — SIGNIFICANT CHANGE UP (ref 0–1.5)
LYMPHOCYTES # BLD AUTO: 3.33 K/UL — HIGH (ref 1–3.3)
LYMPHOCYTES # BLD AUTO: 37.6 % — SIGNIFICANT CHANGE UP (ref 13–44)
MCHC RBC-ENTMCNC: 25.8 PG — LOW (ref 27–34)
MCHC RBC-ENTMCNC: 31.3 GM/DL — LOW (ref 32–36)
MCV RBC AUTO: 82.2 FL — SIGNIFICANT CHANGE UP (ref 80–100)
MEV IGG SER-ACNC: 6.2 AU/ML — SIGNIFICANT CHANGE UP
MEV IGG+IGM SER-IMP: NEGATIVE — SIGNIFICANT CHANGE UP
MONOCYTES # BLD AUTO: 0.44 K/UL — SIGNIFICANT CHANGE UP (ref 0–0.9)
MONOCYTES NFR BLD AUTO: 5 % — SIGNIFICANT CHANGE UP (ref 2–14)
NEUTROPHILS # BLD AUTO: 4.94 K/UL — SIGNIFICANT CHANGE UP (ref 1.8–7.4)
NEUTROPHILS NFR BLD AUTO: 55.8 % — SIGNIFICANT CHANGE UP (ref 43–77)
PLATELET # BLD AUTO: 256 K/UL — SIGNIFICANT CHANGE UP (ref 150–400)
RBC # BLD: 4.27 M/UL — SIGNIFICANT CHANGE UP (ref 3.8–5.2)
RBC # FLD: 17.7 % — HIGH (ref 10.3–14.5)
SARS-COV-2 IGG+IGM SERPL QL IA: 85.1 U/ML — HIGH
SARS-COV-2 IGG+IGM SERPL QL IA: POSITIVE
SARS-COV-2 RNA SPEC QL NAA+PROBE: SIGNIFICANT CHANGE UP
T PALLIDUM AB TITR SER: NEGATIVE — SIGNIFICANT CHANGE UP
WBC # BLD: 8.85 K/UL — SIGNIFICANT CHANGE UP (ref 3.8–10.5)
WBC # FLD AUTO: 8.85 K/UL — SIGNIFICANT CHANGE UP (ref 3.8–10.5)

## 2021-12-16 RX ORDER — OXYCODONE HYDROCHLORIDE 5 MG/1
5 TABLET ORAL ONCE
Refills: 0 | Status: DISCONTINUED | OUTPATIENT
Start: 2021-12-16 | End: 2021-12-17

## 2021-12-16 RX ORDER — AER TRAVELER 0.5 G/1
1 SOLUTION RECTAL; TOPICAL EVERY 4 HOURS
Refills: 0 | Status: DISCONTINUED | OUTPATIENT
Start: 2021-12-16 | End: 2021-12-17

## 2021-12-16 RX ORDER — DIBUCAINE 1 %
1 OINTMENT (GRAM) RECTAL EVERY 6 HOURS
Refills: 0 | Status: DISCONTINUED | OUTPATIENT
Start: 2021-12-16 | End: 2021-12-17

## 2021-12-16 RX ORDER — DIPHENHYDRAMINE HCL 50 MG
25 CAPSULE ORAL EVERY 6 HOURS
Refills: 0 | Status: DISCONTINUED | OUTPATIENT
Start: 2021-12-16 | End: 2021-12-17

## 2021-12-16 RX ORDER — HYDROCORTISONE 1 %
1 OINTMENT (GRAM) TOPICAL EVERY 6 HOURS
Refills: 0 | Status: DISCONTINUED | OUTPATIENT
Start: 2021-12-16 | End: 2021-12-17

## 2021-12-16 RX ORDER — IBUPROFEN 200 MG
600 TABLET ORAL EVERY 6 HOURS
Refills: 0 | Status: DISCONTINUED | OUTPATIENT
Start: 2021-12-16 | End: 2021-12-17

## 2021-12-16 RX ORDER — IBUPROFEN 200 MG
600 TABLET ORAL EVERY 6 HOURS
Refills: 0 | Status: COMPLETED | OUTPATIENT
Start: 2021-12-16 | End: 2022-11-14

## 2021-12-16 RX ORDER — SODIUM CHLORIDE 9 MG/ML
3 INJECTION INTRAMUSCULAR; INTRAVENOUS; SUBCUTANEOUS EVERY 8 HOURS
Refills: 0 | Status: DISCONTINUED | OUTPATIENT
Start: 2021-12-16 | End: 2021-12-17

## 2021-12-16 RX ORDER — ACETAMINOPHEN 500 MG
975 TABLET ORAL
Refills: 0 | Status: DISCONTINUED | OUTPATIENT
Start: 2021-12-16 | End: 2021-12-17

## 2021-12-16 RX ORDER — LANOLIN
1 OINTMENT (GRAM) TOPICAL EVERY 6 HOURS
Refills: 0 | Status: DISCONTINUED | OUTPATIENT
Start: 2021-12-16 | End: 2021-12-17

## 2021-12-16 RX ORDER — BENZOCAINE 10 %
1 GEL (GRAM) MUCOUS MEMBRANE EVERY 6 HOURS
Refills: 0 | Status: DISCONTINUED | OUTPATIENT
Start: 2021-12-16 | End: 2021-12-17

## 2021-12-16 RX ORDER — PRAMOXINE HYDROCHLORIDE 150 MG/15G
1 AEROSOL, FOAM RECTAL EVERY 4 HOURS
Refills: 0 | Status: DISCONTINUED | OUTPATIENT
Start: 2021-12-16 | End: 2021-12-17

## 2021-12-16 RX ORDER — OXYTOCIN 10 UNIT/ML
333.33 VIAL (ML) INJECTION
Qty: 20 | Refills: 0 | Status: DISCONTINUED | OUTPATIENT
Start: 2021-12-16 | End: 2021-12-17

## 2021-12-16 RX ORDER — TETANUS TOXOID, REDUCED DIPHTHERIA TOXOID AND ACELLULAR PERTUSSIS VACCINE, ADSORBED 5; 2.5; 8; 8; 2.5 [IU]/.5ML; [IU]/.5ML; UG/.5ML; UG/.5ML; UG/.5ML
0.5 SUSPENSION INTRAMUSCULAR ONCE
Refills: 0 | Status: DISCONTINUED | OUTPATIENT
Start: 2021-12-16 | End: 2021-12-17

## 2021-12-16 RX ORDER — SIMETHICONE 80 MG/1
80 TABLET, CHEWABLE ORAL EVERY 4 HOURS
Refills: 0 | Status: DISCONTINUED | OUTPATIENT
Start: 2021-12-16 | End: 2021-12-17

## 2021-12-16 RX ORDER — MAGNESIUM HYDROXIDE 400 MG/1
30 TABLET, CHEWABLE ORAL
Refills: 0 | Status: DISCONTINUED | OUTPATIENT
Start: 2021-12-16 | End: 2021-12-17

## 2021-12-16 RX ORDER — KETOROLAC TROMETHAMINE 30 MG/ML
30 SYRINGE (ML) INJECTION ONCE
Refills: 0 | Status: DISCONTINUED | OUTPATIENT
Start: 2021-12-16 | End: 2021-12-17

## 2021-12-16 RX ORDER — OXYCODONE HYDROCHLORIDE 5 MG/1
5 TABLET ORAL
Refills: 0 | Status: DISCONTINUED | OUTPATIENT
Start: 2021-12-16 | End: 2021-12-17

## 2021-12-16 RX ADMIN — Medication 600 MILLIGRAM(S): at 17:47

## 2021-12-16 RX ADMIN — Medication 975 MILLIGRAM(S): at 09:10

## 2021-12-16 RX ADMIN — Medication 600 MILLIGRAM(S): at 12:08

## 2021-12-16 RX ADMIN — Medication 1 TABLET(S): at 12:08

## 2021-12-16 RX ADMIN — Medication 600 MILLIGRAM(S): at 23:56

## 2021-12-16 RX ADMIN — Medication 975 MILLIGRAM(S): at 10:04

## 2021-12-16 RX ADMIN — Medication 600 MILLIGRAM(S): at 12:10

## 2021-12-16 RX ADMIN — Medication 975 MILLIGRAM(S): at 03:50

## 2021-12-16 RX ADMIN — Medication 1000 MILLIUNIT(S)/MIN: at 01:20

## 2021-12-16 RX ADMIN — SODIUM CHLORIDE 3 MILLILITER(S): 9 INJECTION INTRAMUSCULAR; INTRAVENOUS; SUBCUTANEOUS at 05:06

## 2021-12-16 RX ADMIN — Medication 975 MILLIGRAM(S): at 21:17

## 2021-12-16 RX ADMIN — Medication 600 MILLIGRAM(S): at 05:32

## 2021-12-16 RX ADMIN — SODIUM CHLORIDE 3 MILLILITER(S): 9 INJECTION INTRAMUSCULAR; INTRAVENOUS; SUBCUTANEOUS at 21:29

## 2021-12-16 NOTE — DISCHARGE NOTE OB - CARE PLAN
1 Principal Discharge DX:	Normal spontaneous vaginal delivery  Assessment and plan of treatment:	1) Please take ibuprofen and tylenol as needed for pain.  2) Nothing in the vagina for 6 weeks (including no sex, no tampons, and no douching).  3) No tub baths or pools for 2 weeks. Showers are okay.  4) Please call your doctor for a follow up appointment  5) Please call the office sooner if you have heavy vaginal bleeding, severe abdominal pain, or fever over 100.4F.

## 2021-12-16 NOTE — DISCHARGE NOTE OB - PATIENT PORTAL LINK FT
You can access the FollowMyHealth Patient Portal offered by [facility] by registering at the following website: [email or web address]. By joining [employer]’s FollowMyHealth portal, you will also be able to view your health information using other applications (apps) compatible with our system.

## 2021-12-16 NOTE — DISCHARGE NOTE OB - NS MD DC FALL RISK RISK
For information on Fall & Injury Prevention, visit: [email or web address] OR  [email or web address] OR  https://www.cdc.gov/steadi/patient.html

## 2021-12-16 NOTE — OB PROVIDER DELIVERY SUMMARY - NSSELHIDDEN_OBGYN_ALL_OB_FT
[NS_DeliveryAttending1_OBGYN_ALL_OB_FT:[id number]=],[NS_DeliveryRN_OBGYN_ALL_OB_FT:[id number]=],[NS_DeliveryAssist1_OBGYN_ALL_OB_FT:[id number]=]

## 2021-12-16 NOTE — OB NEONATOLOGY/PEDIATRICIAN DELIVERY SUMMARY - NSPEDSNEONOTESA_OBGYN_ALL_OB_FT
Requested by DR Anthony to attend a  of a 30y/o  at 39.5 weeks GA secondary to meconium stained amniotic fluids.  She had + PNC, is blood type O pos, the rest of her labs were unavailable,  COVID19 PUI.  L&D:  [facility] just shortly PTD with meconium stained amniotic fluids .  Baby born vertex with good cry, transferred to warmer, orally suctioned, dried, and examined. Infant showed to father and then transferred to mother for STS.  A/P:  FT male, meconium stained amniotic fluids, maternal PUI  Transition to Regular Nursery under Peds Hospitalist care.  COVID19 precautions as per guidelines until maternal COVID19 results are back

## 2021-12-16 NOTE — DISCHARGE NOTE OB - MATERIALS PROVIDED
New Beginnings/Vaccinations/[facility] [place] Screening Program/  Immunization Record/Breastfeeding Log/Bottle Feeding Log/Breastfeeding Mother’s Support Group Information/Guide to Postpartum Care/[facility] Hearing Screen Program/Back To Sleep Handout/Shaken Baby Prevention Handout/Breastfeeding Guide and Packet/Birth Certificate Instructions New Beginnings/Vaccinations/[facility] [place] Screening Program/  Immunization Record/Breastfeeding Log/Bottle Feeding Log/Breastfeeding Mother’s Support Group Information/Guide to Postpartum Care/[facility] Hearing Screen Program/Back To Sleep Handout/Shaken Baby Prevention Handout/Breastfeeding Guide and Packet/Birth Certificate Instructions/MMR Vaccination (VIS Pub Date: 2012)

## 2021-12-16 NOTE — OB PROVIDER DELIVERY SUMMARY - NSPROVIDERDELIVERYNOTE_OBGYN_ALL_OB_FT
31y  presenting in labor.  Patient felt rectal pressure and was found to be fully dilated, 0 station. She pushed effectively for 10 minutes, and delivered a viable male infant at 0109. Vertex delivered without difficulty, no nuchal cord noted, shoulders then delivered without difficulty. Placenta delivered spontaneously and intact at 0113. Pitocin started. Excellent hemostasis was achieved. Uterus, cervix, perineum and vagina were inspected and no lacerations were noted. Apgars 9/9. EBL 63cc.

## 2021-12-16 NOTE — DISCHARGE NOTE OB - CARE PROVIDER_API CALL
Harleen Stack)  Obstetrics and Gynecology  [address] [place] [address]  Phone: [phone]  Fax: [phone]  Follow Up Time:

## 2021-12-16 NOTE — OB RN DELIVERY SUMMARY - NSSELHIDDEN_OBGYN_ALL_OB_FT
[NS_DeliveryAttending1_OBGYN_ALL_OB_FT:[id number]=],[NS_DeliveryRN_OBGYN_ALL_OB_FT:[id number]=]

## 2021-12-17 VITALS
HEART RATE: 64 BPM | TEMPERATURE: 99 F | SYSTOLIC BLOOD PRESSURE: 120 MMHG | OXYGEN SATURATION: 98 % | DIASTOLIC BLOOD PRESSURE: 75 MMHG | RESPIRATION RATE: 18 BRPM

## 2021-12-17 LAB
HCT VFR BLD CALC: 30.2 % — LOW (ref 34.5–45)
HGB BLD-MCNC: 9.3 G/DL — LOW (ref 11.5–15.5)

## 2021-12-17 PROCEDURE — 59025 FETAL NON-STRESS TEST: CPT

## 2021-12-17 PROCEDURE — 85014 HEMATOCRIT: CPT

## 2021-12-17 PROCEDURE — 86850 RBC ANTIBODY SCREEN: CPT

## 2021-12-17 PROCEDURE — 85025 COMPLETE CBC W/AUTO DIFF WBC: CPT

## 2021-12-17 PROCEDURE — 86765 RUBEOLA ANTIBODY: CPT

## 2021-12-17 PROCEDURE — U0005: CPT

## 2021-12-17 PROCEDURE — 86780 TREPONEMA PALLIDUM: CPT

## 2021-12-17 PROCEDURE — 59050 FETAL MONITOR W/REPORT: CPT

## 2021-12-17 PROCEDURE — 86901 BLOOD TYPING SEROLOGIC RH(D): CPT

## 2021-12-17 PROCEDURE — G0463: CPT

## 2021-12-17 PROCEDURE — 85018 HEMOGLOBIN: CPT

## 2021-12-17 PROCEDURE — U0003: CPT

## 2021-12-17 PROCEDURE — 36415 COLL VENOUS BLD VENIPUNCTURE: CPT

## 2021-12-17 PROCEDURE — 90707 MMR VACCINE SC: CPT

## 2021-12-17 PROCEDURE — 86900 BLOOD TYPING SEROLOGIC ABO: CPT

## 2021-12-17 PROCEDURE — 86769 SARS-COV-2 COVID-19 ANTIBODY: CPT

## 2021-12-17 RX ORDER — ACETAMINOPHEN 500 MG
2 TABLET ORAL
Qty: 56 | Refills: 0
Start: 2021-12-17 | End: 2021-12-23

## 2021-12-17 RX ORDER — IBUPROFEN 200 MG
1 TABLET ORAL
Qty: 56 | Refills: 0
Start: 2021-12-17 | End: 2021-12-30

## 2021-12-17 RX ADMIN — Medication 975 MILLIGRAM(S): at 08:55

## 2021-12-17 RX ADMIN — Medication 1 TABLET(S): at 11:28

## 2021-12-17 RX ADMIN — SODIUM CHLORIDE 3 MILLILITER(S): 9 INJECTION INTRAMUSCULAR; INTRAVENOUS; SUBCUTANEOUS at 06:02

## 2021-12-17 RX ADMIN — Medication 600 MILLIGRAM(S): at 05:45

## 2021-12-17 RX ADMIN — Medication 0.5 MILLILITER(S): at 11:26

## 2021-12-17 RX ADMIN — Medication 975 MILLIGRAM(S): at 09:30

## 2021-12-17 RX ADMIN — Medication 600 MILLIGRAM(S): at 11:28

## 2021-12-17 NOTE — PROGRESS NOTE ADULT - ASSESSMENT
Patient is s/p  day# 1  Patient is feeling well and reports no issues.     Continue the current management with current pain medication regimen.   Encourage ambulation and a regular diet.   Discharge home according to the normal criteria.

## 2021-12-17 NOTE — PROGRESS NOTE ADULT - SUBJECTIVE AND OBJECTIVE BOX
MAKENZIE [id number]  Postpartum Note Vaginal Delivery  Patient is a 30yo  s/p FT  day 1.    Subjective:  No acute events overnight.   Patient is tolerating diet and denies N/V.   Patient still has slight vaginal bleeding that is decreasing in amount.   She is breastfeeding and the baby is latching on.    Urinating appropriately.   -BM/+flatus.    Physical exam:  Vital Signs Last 24 Hrs  T(C): 36.7 (17 Dec 2021 04:33), Max: 36.8 (16 Dec 2021 15:27)  T(F): 98.1 (17 Dec 2021 04:33), Max: 98.2 (16 Dec 2021 15:27)  HR: 68 (17 Dec 2021 04:33) (64 - 68)  BP: 102/63 (17 Dec 2021 04:33) (102/63 - 112/81)  RR: 18 (17 Dec 2021 04:33) (18 - 18)  SpO2: 99% (17 Dec 2021 04:33) (98% - 99%)    Heart: RRR  Lungs: CTABL  Breast: non tender, not engorged   Abdomen: Soft, nontender, no distension, firm uterine fundus below umbilicus  Ext: No DVT signs, warm extremities    LABS:                        11.0   8.85  )-----------( 256      ( 16 Dec 2021 00:24 )             35.1       acetaminophen     Tablet .. 975 milliGRAM(s) Oral <User Schedule>  benzocaine 20%/menthol 0.5% Spray 1 Spray(s) Topical every 6 hours PRN  dibucaine 1% Ointment 1 Application(s) Topical every 6 hours PRN  diphenhydrAMINE 25 milliGRAM(s) Oral every 6 hours PRN  diphtheria/tetanus/pertussis (acellular) Vaccine (ADAcel) 0.5 milliLiter(s) IntraMuscular once  hydrocortisone 1% Cream 1 Application(s) Topical every 6 hours PRN  ibuprofen  Tablet. 600 milliGRAM(s) Oral every 6 hours  ketorolac   Injectable 30 milliGRAM(s) IV Push once  lanolin Ointment 1 Application(s) Topical every 6 hours PRN  magnesium hydroxide Suspension 30 milliLiter(s) Oral two times a day PRN  oxyCODONE    IR 5 milliGRAM(s) Oral once PRN  oxyCODONE    IR 5 milliGRAM(s) Oral every 3 hours PRN  oxytocin Infusion 333.333 milliUNIT(s)/Min IV Continuous <Continuous>  pramoxine 1%/zinc 5% Cream 1 Application(s) Topical every 4 hours PRN  prenatal multivitamin 1 Tablet(s) Oral daily  simethicone 80 milliGRAM(s) Chew every 4 hours PRN  sodium chloride 0.9% lock flush 3 milliLiter(s) IV Push every 8 hours  witch hazel Pads 1 Application(s) Topical every 4 hours PRN

## 2024-04-23 NOTE — OB NEONATOLOGY/PEDIATRICIAN DELIVERY SUMMARY - BABY A: STOOL IN DELIVERY
[facility] Care Coordination Contact    Member changed health plan products from [facility] to [employer] [facility] effective 4/1/24. CC to complete items on Product Change/ Health Plan Change check list including MMIS entry. CMS mailed Welcome Letter, supporting documents, verified MNits & health plan eligibility and other required tasks.    Nile Dykes  [facility]  Case Management Specialist  [phone]     yes
